# Patient Record
Sex: FEMALE | RURAL
[De-identification: names, ages, dates, MRNs, and addresses within clinical notes are randomized per-mention and may not be internally consistent; named-entity substitution may affect disease eponyms.]

---

## 2019-04-22 ENCOUNTER — APPOINTMENT (OUTPATIENT)
Age: 44
Setting detail: DERMATOLOGY
End: 2019-04-23

## 2019-04-22 DIAGNOSIS — L57.8 OTHER SKIN CHANGES DUE TO CHRONIC EXPOSURE TO NONIONIZING RADIATION: ICD-10-CM

## 2019-04-22 DIAGNOSIS — F17.200 NICOTINE DEPENDENCE, UNSPECIFIED, UNCOMPLICATED: ICD-10-CM

## 2019-04-22 DIAGNOSIS — L81.4 OTHER MELANIN HYPERPIGMENTATION: ICD-10-CM

## 2019-04-22 DIAGNOSIS — D485 NEOPLASM OF UNCERTAIN BEHAVIOR OF SKIN: ICD-10-CM

## 2019-04-22 PROBLEM — D48.5 NEOPLASM OF UNCERTAIN BEHAVIOR OF SKIN: Status: ACTIVE | Noted: 2019-04-22

## 2019-04-22 PROCEDURE — OTHER MIPS QUALITY: OTHER

## 2019-04-22 PROCEDURE — OTHER COUNSELING: OTHER

## 2019-04-22 PROCEDURE — 11102 TANGNTL BX SKIN SINGLE LES: CPT

## 2019-04-22 PROCEDURE — 99203 OFFICE O/P NEW LOW 30 MIN: CPT | Mod: 25

## 2019-04-22 PROCEDURE — OTHER REASSURANCE: OTHER

## 2019-04-22 PROCEDURE — OTHER BIOPSY BY SHAVE METHOD: OTHER

## 2019-04-22 ASSESSMENT — LOCATION SIMPLE DESCRIPTION DERM
LOCATION SIMPLE: POSTERIOR SCALP
LOCATION SIMPLE: LEFT CHEEK
LOCATION SIMPLE: CHEST
LOCATION SIMPLE: LEFT CLAVICULAR SKIN

## 2019-04-22 ASSESSMENT — LOCATION DETAILED DESCRIPTION DERM
LOCATION DETAILED: RIGHT INFERIOR OCCIPITAL SCALP
LOCATION DETAILED: UPPER STERNUM
LOCATION DETAILED: LEFT INFERIOR MEDIAL MALAR CHEEK
LOCATION DETAILED: LEFT CLAVICULAR SKIN

## 2019-04-22 ASSESSMENT — LOCATION ZONE DERM
LOCATION ZONE: FACE
LOCATION ZONE: TRUNK
LOCATION ZONE: SCALP

## 2020-04-15 ENCOUNTER — APPOINTMENT (OUTPATIENT)
Age: 45
Setting detail: DERMATOLOGY
End: 2020-04-15

## 2020-04-15 DIAGNOSIS — L82.1 OTHER SEBORRHEIC KERATOSIS: ICD-10-CM

## 2020-04-15 DIAGNOSIS — L57.3 POIKILODERMA OF CIVATTE: ICD-10-CM

## 2020-04-15 DIAGNOSIS — L81.4 OTHER MELANIN HYPERPIGMENTATION: ICD-10-CM

## 2020-04-15 DIAGNOSIS — D18.0 HEMANGIOMA: ICD-10-CM

## 2020-04-15 DIAGNOSIS — F17.200 NICOTINE DEPENDENCE, UNSPECIFIED, UNCOMPLICATED: ICD-10-CM

## 2020-04-15 PROBLEM — D18.01 HEMANGIOMA OF SKIN AND SUBCUTANEOUS TISSUE: Status: ACTIVE | Noted: 2020-04-15

## 2020-04-15 PROCEDURE — 99213 OFFICE O/P EST LOW 20 MIN: CPT

## 2020-04-15 PROCEDURE — OTHER REASSURANCE: OTHER

## 2020-04-15 PROCEDURE — OTHER TREATMENT REGIMEN: OTHER

## 2020-04-15 PROCEDURE — OTHER MIPS QUALITY: OTHER

## 2020-04-15 PROCEDURE — OTHER COUNSELING: OTHER

## 2020-04-15 ASSESSMENT — LOCATION DETAILED DESCRIPTION DERM
LOCATION DETAILED: LEFT INFERIOR CENTRAL MALAR CHEEK
LOCATION DETAILED: RIGHT SUPERIOR FOREHEAD
LOCATION DETAILED: SUPERIOR THORACIC SPINE
LOCATION DETAILED: RIGHT INFERIOR CENTRAL MALAR CHEEK
LOCATION DETAILED: NASAL DORSUM
LOCATION DETAILED: LEFT INFERIOR ANTERIOR NECK

## 2020-04-15 ASSESSMENT — LOCATION SIMPLE DESCRIPTION DERM
LOCATION SIMPLE: RIGHT FOREHEAD
LOCATION SIMPLE: RIGHT CHEEK
LOCATION SIMPLE: LEFT CHEEK
LOCATION SIMPLE: NOSE
LOCATION SIMPLE: LEFT ANTERIOR NECK
LOCATION SIMPLE: UPPER BACK

## 2020-04-15 ASSESSMENT — LOCATION ZONE DERM
LOCATION ZONE: FACE
LOCATION ZONE: NECK
LOCATION ZONE: TRUNK
LOCATION ZONE: NOSE

## 2020-04-15 NOTE — PROCEDURE: COUNSELING
Quality 226: Preventive Care And Screening: Tobacco Use: Screening And Cessation Intervention: Patient screened for tobacco and is a smoker AND received Cessation Counseling
Detail Level: Zone
Detail Level: Detailed

## 2022-03-04 ENCOUNTER — APPOINTMENT (OUTPATIENT)
Age: 47
Setting detail: DERMATOLOGY
End: 2022-03-07

## 2022-03-04 VITALS — TEMPERATURE: 98.7 F

## 2022-03-04 DIAGNOSIS — L73.8 OTHER SPECIFIED FOLLICULAR DISORDERS: ICD-10-CM

## 2022-03-04 DIAGNOSIS — L81.4 OTHER MELANIN HYPERPIGMENTATION: ICD-10-CM

## 2022-03-04 DIAGNOSIS — D22 MELANOCYTIC NEVI: ICD-10-CM

## 2022-03-04 DIAGNOSIS — L57.8 OTHER SKIN CHANGES DUE TO CHRONIC EXPOSURE TO NONIONIZING RADIATION: ICD-10-CM

## 2022-03-04 PROBLEM — D22.5 MELANOCYTIC NEVI OF TRUNK: Status: ACTIVE | Noted: 2022-03-04

## 2022-03-04 PROCEDURE — 99213 OFFICE O/P EST LOW 20 MIN: CPT

## 2022-03-04 PROCEDURE — OTHER COUNSELING: OTHER

## 2022-03-04 PROCEDURE — OTHER MIPS QUALITY: OTHER

## 2022-03-04 PROCEDURE — OTHER TREATMENT REGIMEN: OTHER

## 2022-03-04 ASSESSMENT — LOCATION SIMPLE DESCRIPTION DERM
LOCATION SIMPLE: LEFT SHOULDER
LOCATION SIMPLE: RIGHT FOREHEAD
LOCATION SIMPLE: LEFT ANTERIOR NECK
LOCATION SIMPLE: LEFT CHEEK
LOCATION SIMPLE: LEFT FOREHEAD
LOCATION SIMPLE: ABDOMEN
LOCATION SIMPLE: LEFT LOWER BACK

## 2022-03-04 ASSESSMENT — LOCATION DETAILED DESCRIPTION DERM
LOCATION DETAILED: LEFT SUPERIOR MEDIAL MIDBACK
LOCATION DETAILED: LEFT ANTERIOR SHOULDER
LOCATION DETAILED: LEFT INFERIOR LATERAL NECK
LOCATION DETAILED: RIGHT INFERIOR LATERAL FOREHEAD
LOCATION DETAILED: LEFT CENTRAL MALAR CHEEK
LOCATION DETAILED: LEFT LATERAL FOREHEAD
LOCATION DETAILED: LEFT LATERAL ABDOMEN
LOCATION DETAILED: LEFT POSTERIOR SHOULDER

## 2022-03-04 ASSESSMENT — LOCATION ZONE DERM
LOCATION ZONE: TRUNK
LOCATION ZONE: NECK
LOCATION ZONE: ARM
LOCATION ZONE: FACE

## 2022-04-08 ENCOUNTER — APPOINTMENT (OUTPATIENT)
Age: 47
Setting detail: DERMATOLOGY
End: 2022-04-11

## 2022-04-08 VITALS — TEMPERATURE: 97.8 F

## 2022-04-08 DIAGNOSIS — L81.4 OTHER MELANIN HYPERPIGMENTATION: ICD-10-CM

## 2022-04-08 DIAGNOSIS — L73.8 OTHER SPECIFIED FOLLICULAR DISORDERS: ICD-10-CM

## 2022-04-08 PROCEDURE — OTHER TREATMENT REGIMEN: OTHER

## 2022-04-08 PROCEDURE — OTHER MIPS QUALITY: OTHER

## 2022-04-08 PROCEDURE — 99213 OFFICE O/P EST LOW 20 MIN: CPT

## 2022-04-08 PROCEDURE — OTHER COUNSELING: OTHER

## 2022-04-08 ASSESSMENT — LOCATION DETAILED DESCRIPTION DERM
LOCATION DETAILED: LEFT CENTRAL MALAR CHEEK
LOCATION DETAILED: LEFT LATERAL FOREHEAD
LOCATION DETAILED: RIGHT INFERIOR LATERAL FOREHEAD

## 2022-04-08 ASSESSMENT — LOCATION SIMPLE DESCRIPTION DERM
LOCATION SIMPLE: RIGHT FOREHEAD
LOCATION SIMPLE: LEFT FOREHEAD
LOCATION SIMPLE: LEFT CHEEK

## 2022-04-08 ASSESSMENT — LOCATION ZONE DERM: LOCATION ZONE: FACE

## 2022-05-09 ENCOUNTER — APPOINTMENT (OUTPATIENT)
Age: 47
Setting detail: DERMATOLOGY
End: 2022-05-10

## 2022-05-09 VITALS — TEMPERATURE: 97.8 F

## 2022-05-09 DIAGNOSIS — L73.8 OTHER SPECIFIED FOLLICULAR DISORDERS: ICD-10-CM

## 2022-05-09 DIAGNOSIS — L81.4 OTHER MELANIN HYPERPIGMENTATION: ICD-10-CM

## 2022-05-09 PROCEDURE — 99213 OFFICE O/P EST LOW 20 MIN: CPT

## 2022-05-09 PROCEDURE — OTHER COUNSELING: OTHER

## 2022-05-09 PROCEDURE — OTHER TREATMENT REGIMEN: OTHER

## 2022-05-09 PROCEDURE — OTHER MIPS QUALITY: OTHER

## 2022-05-09 ASSESSMENT — LOCATION SIMPLE DESCRIPTION DERM
LOCATION SIMPLE: LEFT CHEEK
LOCATION SIMPLE: RIGHT FOREHEAD
LOCATION SIMPLE: LEFT FOREHEAD

## 2022-05-09 ASSESSMENT — LOCATION ZONE DERM: LOCATION ZONE: FACE

## 2022-05-09 ASSESSMENT — LOCATION DETAILED DESCRIPTION DERM
LOCATION DETAILED: RIGHT INFERIOR LATERAL FOREHEAD
LOCATION DETAILED: LEFT CENTRAL MALAR CHEEK
LOCATION DETAILED: LEFT LATERAL FOREHEAD

## 2022-06-07 ENCOUNTER — APPOINTMENT (OUTPATIENT)
Age: 47
Setting detail: DERMATOLOGY
End: 2022-06-08

## 2022-06-07 VITALS — TEMPERATURE: 98.7 F

## 2022-06-07 DIAGNOSIS — L81.4 OTHER MELANIN HYPERPIGMENTATION: ICD-10-CM

## 2022-06-07 PROCEDURE — 99213 OFFICE O/P EST LOW 20 MIN: CPT

## 2022-06-07 PROCEDURE — OTHER MIPS QUALITY: OTHER

## 2022-06-07 PROCEDURE — OTHER TREATMENT REGIMEN: OTHER

## 2022-06-07 PROCEDURE — OTHER REASSURANCE: OTHER

## 2022-06-07 PROCEDURE — OTHER COUNSELING: OTHER

## 2022-06-07 ASSESSMENT — LOCATION DETAILED DESCRIPTION DERM
LOCATION DETAILED: LEFT CENTRAL MANDIBULAR CHEEK
LOCATION DETAILED: RIGHT LATERAL MANDIBULAR CHEEK
LOCATION DETAILED: LEFT CENTRAL MALAR CHEEK

## 2022-06-07 ASSESSMENT — LOCATION ZONE DERM: LOCATION ZONE: FACE

## 2022-06-07 ASSESSMENT — LOCATION SIMPLE DESCRIPTION DERM
LOCATION SIMPLE: LEFT CHEEK
LOCATION SIMPLE: RIGHT CHEEK

## 2023-03-14 ENCOUNTER — APPOINTMENT (OUTPATIENT)
Age: 48
Setting detail: DERMATOLOGY
End: 2023-03-15

## 2023-03-14 VITALS — TEMPERATURE: 98.1 F

## 2023-03-14 DIAGNOSIS — L82.1 OTHER SEBORRHEIC KERATOSIS: ICD-10-CM

## 2023-03-14 DIAGNOSIS — L57.0 ACTINIC KERATOSIS: ICD-10-CM

## 2023-03-14 DIAGNOSIS — L81.4 OTHER MELANIN HYPERPIGMENTATION: ICD-10-CM

## 2023-03-14 PROCEDURE — 99213 OFFICE O/P EST LOW 20 MIN: CPT

## 2023-03-14 PROCEDURE — OTHER PRESCRIPTION: OTHER

## 2023-03-14 PROCEDURE — OTHER TREATMENT REGIMEN: OTHER

## 2023-03-14 PROCEDURE — OTHER PRESCRIPTION MEDICATION MANAGEMENT: OTHER

## 2023-03-14 PROCEDURE — OTHER REASSURANCE: OTHER

## 2023-03-14 PROCEDURE — OTHER COUNSELING: OTHER

## 2023-03-14 PROCEDURE — OTHER MIPS QUALITY: OTHER

## 2023-03-14 RX ORDER — FLUOROURACIL 5 MG/G
CREAM TOPICAL QD
Qty: 40 | Refills: 1 | Status: ERX | COMMUNITY
Start: 2023-03-14

## 2023-03-14 ASSESSMENT — LOCATION SIMPLE DESCRIPTION DERM
LOCATION SIMPLE: LEFT CHEEK
LOCATION SIMPLE: RIGHT NOSE
LOCATION SIMPLE: RIGHT EAR
LOCATION SIMPLE: RIGHT CHEEK

## 2023-03-14 ASSESSMENT — LOCATION DETAILED DESCRIPTION DERM
LOCATION DETAILED: LEFT SUPERIOR CENTRAL BUCCAL CHEEK
LOCATION DETAILED: RIGHT CENTRAL MALAR CHEEK
LOCATION DETAILED: RIGHT LATERAL MANDIBULAR CHEEK
LOCATION DETAILED: LEFT CENTRAL MALAR CHEEK
LOCATION DETAILED: RIGHT CRUS OF HELIX
LOCATION DETAILED: LEFT CENTRAL MANDIBULAR CHEEK
LOCATION DETAILED: RIGHT NASAL SIDEWALL

## 2023-03-14 ASSESSMENT — LOCATION ZONE DERM
LOCATION ZONE: EAR
LOCATION ZONE: FACE
LOCATION ZONE: NOSE

## 2023-04-14 ENCOUNTER — APPOINTMENT (OUTPATIENT)
Age: 48
Setting detail: DERMATOLOGY
End: 2023-04-17

## 2023-04-14 VITALS — TEMPERATURE: 98 F

## 2023-04-14 DIAGNOSIS — Z41.9 ENCOUNTER FOR PROCEDURE FOR PURPOSES OTHER THAN REMEDYING HEALTH STATE, UNSPECIFIED: ICD-10-CM

## 2023-04-14 DIAGNOSIS — L81.4 OTHER MELANIN HYPERPIGMENTATION: ICD-10-CM

## 2023-04-14 DIAGNOSIS — L57.0 ACTINIC KERATOSIS: ICD-10-CM

## 2023-04-14 PROCEDURE — OTHER MIPS QUALITY: OTHER

## 2023-04-14 PROCEDURE — OTHER PRESCRIPTION MEDICATION MANAGEMENT: OTHER

## 2023-04-14 PROCEDURE — OTHER COUNSELING: OTHER

## 2023-04-14 PROCEDURE — 99213 OFFICE O/P EST LOW 20 MIN: CPT

## 2023-04-14 PROCEDURE — OTHER TREATMENT REGIMEN: OTHER

## 2023-04-14 PROCEDURE — OTHER BOTOX: OTHER

## 2023-04-14 ASSESSMENT — LOCATION SIMPLE DESCRIPTION DERM
LOCATION SIMPLE: RIGHT NOSE
LOCATION SIMPLE: LEFT CHEEK

## 2023-04-14 ASSESSMENT — LOCATION DETAILED DESCRIPTION DERM
LOCATION DETAILED: RIGHT NASAL SIDEWALL
LOCATION DETAILED: LEFT CENTRAL MALAR CHEEK

## 2023-04-14 ASSESSMENT — LOCATION ZONE DERM
LOCATION ZONE: NOSE
LOCATION ZONE: FACE

## 2023-04-14 NOTE — PROCEDURE: BOTOX
Lateral Platysmal Bands Units: 0
Show Orbicularis Oculi Units: Yes
Show Right And Left Pupillary Line Units: No
Incrementing Botox Units: By 0.5 Units
Dilution (U/0.1 Cc): 2.6
Forehead Units: 18
Expiration Date (Month Year): 12/2020
Price (Use Numbers Only, No Special Characters Or $): 220.00
Consent: Written consent obtained. Risks include but not limited to lid/brow ptosis, bruising, swelling, diplopia, temporary effect, incomplete chemical denervation.
Show Inventory Tab: Show
Post-Care Instructions: Patient instructed to not lie down for 4 hours and limit physical activity for 24 hours. Patient instructed not to travel by airplane for 48 hours.
Glabellar Complex Units: 10
Lot #: [id number]
Detail Level: Detailed

## 2023-05-19 ENCOUNTER — APPOINTMENT (OUTPATIENT)
Age: 48
Setting detail: DERMATOLOGY
End: 2023-05-22

## 2023-05-19 VITALS — TEMPERATURE: 98.2 F

## 2023-05-19 DIAGNOSIS — L81.4 OTHER MELANIN HYPERPIGMENTATION: ICD-10-CM

## 2023-05-19 DIAGNOSIS — L28.1 PRURIGO NODULARIS: ICD-10-CM

## 2023-05-19 PROCEDURE — OTHER TREATMENT REGIMEN: OTHER

## 2023-05-19 PROCEDURE — 99213 OFFICE O/P EST LOW 20 MIN: CPT

## 2023-05-19 PROCEDURE — OTHER COUNSELING: OTHER

## 2023-05-19 PROCEDURE — OTHER PRESCRIPTION: OTHER

## 2023-05-19 PROCEDURE — OTHER MIPS QUALITY: OTHER

## 2023-05-19 RX ORDER — TRETIONIN 0.5 MG/G
CREAM TOPICAL
Qty: 45 | Refills: 3 | Status: ERX | COMMUNITY
Start: 2023-05-19

## 2023-05-19 ASSESSMENT — LOCATION ZONE DERM
LOCATION ZONE: NECK
LOCATION ZONE: FACE

## 2023-05-19 ASSESSMENT — LOCATION DETAILED DESCRIPTION DERM
LOCATION DETAILED: LEFT CENTRAL MALAR CHEEK
LOCATION DETAILED: RIGHT SUPERIOR POSTERIOR NECK

## 2023-05-19 ASSESSMENT — LOCATION SIMPLE DESCRIPTION DERM
LOCATION SIMPLE: LEFT CHEEK
LOCATION SIMPLE: POSTERIOR NECK

## 2023-05-19 NOTE — PROCEDURE: TREATMENT REGIMEN
Plan: -\\n-ILK injection to lesion
Detail Level: Zone
Plan: -\\n-recommend another chemical peel\\nRecommend HQRA\\nGot 70 tca 20

## 2023-06-07 ENCOUNTER — APPOINTMENT (OUTPATIENT)
Age: 48
Setting detail: DERMATOLOGY
End: 2023-06-08

## 2023-06-07 DIAGNOSIS — L81.4 OTHER MELANIN HYPERPIGMENTATION: ICD-10-CM

## 2023-06-07 PROCEDURE — OTHER MIPS QUALITY: OTHER

## 2023-06-07 PROCEDURE — OTHER TREATMENT REGIMEN: OTHER

## 2023-06-07 PROCEDURE — OTHER PRESCRIPTION: OTHER

## 2023-06-07 PROCEDURE — OTHER COUNSELING: OTHER

## 2023-06-07 PROCEDURE — 99212 OFFICE O/P EST SF 10 MIN: CPT

## 2023-06-07 RX ORDER — TRETIONIN 0.5 MG/G
CREAM TOPICAL
Qty: 45 | Refills: 3 | Status: ERX

## 2023-06-07 ASSESSMENT — LOCATION DETAILED DESCRIPTION DERM: LOCATION DETAILED: LEFT CENTRAL MALAR CHEEK

## 2023-06-07 ASSESSMENT — LOCATION SIMPLE DESCRIPTION DERM: LOCATION SIMPLE: LEFT CHEEK

## 2023-06-07 ASSESSMENT — LOCATION ZONE DERM: LOCATION ZONE: FACE

## 2023-06-07 NOTE — PROCEDURE: TREATMENT REGIMEN
Plan: -\\n- Follow up one month chemical peel\\n\\nChemical Peel #2 Glycolic 70% tca 30%
Detail Level: Zone
Initiate Treatment: In one week compound

## 2023-06-12 ENCOUNTER — RX ONLY (RX ONLY)
Age: 48
End: 2023-06-12

## 2023-06-12 RX ORDER — TRETIONIN 0.5 MG/G
CREAM TOPICAL
Qty: 20 | Refills: 3 | Status: ERX

## 2023-07-07 ENCOUNTER — APPOINTMENT (OUTPATIENT)
Age: 48
Setting detail: DERMATOLOGY
End: 2023-07-10

## 2023-07-07 DIAGNOSIS — L81.4 OTHER MELANIN HYPERPIGMENTATION: ICD-10-CM

## 2023-07-07 PROCEDURE — OTHER TREATMENT REGIMEN: OTHER

## 2023-07-07 PROCEDURE — OTHER MIPS QUALITY: OTHER

## 2023-07-07 PROCEDURE — 99212 OFFICE O/P EST SF 10 MIN: CPT

## 2023-07-07 PROCEDURE — OTHER COUNSELING: OTHER

## 2023-07-07 ASSESSMENT — LOCATION ZONE DERM: LOCATION ZONE: FACE

## 2023-07-07 ASSESSMENT — LOCATION DETAILED DESCRIPTION DERM: LOCATION DETAILED: LEFT CENTRAL MALAR CHEEK

## 2023-07-07 ASSESSMENT — LOCATION SIMPLE DESCRIPTION DERM: LOCATION SIMPLE: LEFT CHEEK

## 2023-11-30 NOTE — PROCEDURE: MIPS QUALITY
Detail Level: Detailed
Quality 110: Preventive Care And Screening: Influenza Immunization: Influenza Immunization Administered during Influenza season
Quality 226: Preventive Care And Screening: Tobacco Use: Screening And Cessation Intervention: Patient screened for tobacco use and is an ex/non-smoker
1

## 2025-04-08 ENCOUNTER — APPOINTMENT (OUTPATIENT)
Age: 50
Setting detail: DERMATOLOGY
End: 2025-04-09

## 2025-04-08 DIAGNOSIS — L29.89 OTHER PRURITUS: ICD-10-CM

## 2025-04-08 DIAGNOSIS — L28.1 PRURIGO NODULARIS: ICD-10-CM

## 2025-04-08 PROCEDURE — 99213 OFFICE O/P EST LOW 20 MIN: CPT | Mod: 25

## 2025-04-08 PROCEDURE — OTHER PRESCRIPTION: OTHER

## 2025-04-08 PROCEDURE — OTHER MIPS QUALITY: OTHER

## 2025-04-08 PROCEDURE — 11900 INJECT SKIN LESIONS </W 7: CPT

## 2025-04-08 PROCEDURE — OTHER COUNSELING: OTHER

## 2025-04-08 PROCEDURE — OTHER PRESCRIPTION MEDICATION MANAGEMENT: OTHER

## 2025-04-08 PROCEDURE — OTHER INTRALESIONAL KENALOG: OTHER

## 2025-04-08 RX ORDER — CLOBETASOL PROPIONATE 0.5 MG/ML
SOLUTION TOPICAL
Qty: 50 | Refills: 6 | Status: ERX | COMMUNITY
Start: 2025-04-08

## 2025-04-08 ASSESSMENT — LOCATION DETAILED DESCRIPTION DERM
LOCATION DETAILED: LEFT SUPERIOR POSTERIOR NECK
LOCATION DETAILED: RIGHT INFERIOR OCCIPITAL SCALP
LOCATION DETAILED: RIGHT SUPERIOR POSTERIOR NECK

## 2025-04-08 ASSESSMENT — LOCATION ZONE DERM
LOCATION ZONE: NECK
LOCATION ZONE: SCALP

## 2025-04-08 ASSESSMENT — LOCATION SIMPLE DESCRIPTION DERM
LOCATION SIMPLE: POSTERIOR NECK
LOCATION SIMPLE: POSTERIOR SCALP

## 2025-04-08 NOTE — PROCEDURE: INTRALESIONAL KENALOG
Ndc# For Kenalog Only: [id number]
How Many Mls Were Removed From The 40 Mg/Ml (10ml) Vial When Preparing The Injectable Solution?: 0
Show Inventory Tab: Hide
Bill For Wasted Drug (Kenalog)?: no
Total Volume (Ccs): 1
Validate Note Data When Using Inventory: Yes
Kenalog Preparation: Kenalog
Medical Necessity Clause: This procedure was medically necessary because the lesions that were treated were:
Lot # For Kenalog (Optional): [id number]
Administered By (Optional): Kyle lynn
Detail Level: Detailed
Consent: The risks of atrophy were reviewed with the patient.
Expiration Date For Kenalog (Optional): jun 2027
Kenalog Type Of Vial: Multiple Dose
Concentration Of Kenalog Solution Injected (Mg/Ml): 5.0

## 2025-04-08 NOTE — PROCEDURE: PRESCRIPTION MEDICATION MANAGEMENT
Detail Level: Zone
Render In Strict Bullet Format?: No
Plan: Ilk injection done today
Initiate Treatment: Clobetasol scalp solution